# Patient Record
Sex: FEMALE | Race: BLACK OR AFRICAN AMERICAN | NOT HISPANIC OR LATINO | URBAN - METROPOLITAN AREA
[De-identification: names, ages, dates, MRNs, and addresses within clinical notes are randomized per-mention and may not be internally consistent; named-entity substitution may affect disease eponyms.]

---

## 2020-07-25 ENCOUNTER — TELEPHONE ENCOUNTER (OUTPATIENT)
Dept: URBAN - METROPOLITAN AREA CLINIC 13 | Facility: CLINIC | Age: 65
End: 2020-07-25

## 2020-07-25 RX ORDER — SUCRALFATE 1 G/10ML
TAKE 1 TEASPOONFUL 4 TIMES DAILY BEFORE MEALS AND AT BEDTIME SUSPENSION ORAL
Qty: 1 | Refills: 3 | OUTPATIENT
Start: 2014-08-25 | End: 2014-09-26

## 2020-07-25 RX ORDER — PANTOPRAZOLE SODIUM 40 MG/1
TAKE 1 TABLET DAILY TABLET, DELAYED RELEASE ORAL
Qty: 30 | Refills: 8 | OUTPATIENT
Start: 2014-02-13 | End: 2014-04-14

## 2020-07-25 RX ORDER — OMEPRAZOLE 40 MG/1
TAKE 1 CAPSULE DAILY CAPSULE, DELAYED RELEASE ORAL
Qty: 30 | Refills: 5 | OUTPATIENT
Start: 2014-09-26 | End: 2015-04-03

## 2020-07-25 RX ORDER — LISINOPRIL AND HYDROCHLOROTHIAZIDE TABLETS 20; 12.5 MG/1; MG/1
TAKE 1 TABLET DAILY TABLET ORAL
Refills: 0 | OUTPATIENT
End: 2014-06-04

## 2020-07-25 RX ORDER — POLYETHYLENE GLYCOL 3350, SODIUM SULFATE, SODIUM CHLORIDE, POTASSIUM CHLORIDE, ASCORBIC ACID, SODIUM ASCORBATE 7.5-2.691G
USE AS DIRECTED KIT ORAL
Qty: 1 | Refills: 0 | OUTPATIENT
Start: 2014-02-13 | End: 2014-04-14

## 2020-07-25 RX ORDER — OMEPRAZOLE 20 MG/1
TAKE 1 CAPSULE DAILY CAPSULE, DELAYED RELEASE ORAL
Refills: 0 | OUTPATIENT
End: 2015-04-03

## 2020-07-25 RX ORDER — OMEPRAZOLE 20 MG/1
TAKE 1 TABLET DAILY TABLET, DELAYED RELEASE ORAL
Refills: 0 | OUTPATIENT
End: 2014-03-18

## 2020-07-25 RX ORDER — LANSOPRAZOLE 30 MG/1
TAKE 1 CAPSULE DAILY CAPSULE, DELAYED RELEASE ORAL
Qty: 30 | Refills: 5 | OUTPATIENT
Start: 2014-04-21 | End: 2014-06-04

## 2020-07-25 RX ORDER — SUCRALFATE 1 G/1
TAKE 1 TABLET 4 TIMES DAILY CRUSH TABLET IN 2 TBLS OF LIQUID TO MAKE SLURRY AND SWALLOW TABLET ORAL
Qty: 120 | Refills: 3 | OUTPATIENT
Start: 2014-09-04 | End: 2015-04-03

## 2020-07-26 ENCOUNTER — TELEPHONE ENCOUNTER (OUTPATIENT)
Dept: URBAN - METROPOLITAN AREA CLINIC 13 | Facility: CLINIC | Age: 65
End: 2020-07-26

## 2020-07-26 RX ORDER — B1/B2/B3/B5/B6/IRON/METH/CHOLN 2.5-18/15
TAKE 1 TABLET DAILY AS DIRECTED LIQUID (ML) ORAL
Refills: 0 | Status: ACTIVE | COMMUNITY

## 2020-07-26 RX ORDER — AMITRIPTYLINE HYDROCHLORIDE 10 MG/1
TAKE 1 TABLET DAILY AT BEDTIME TABLET, FILM COATED ORAL
Qty: 30 | Refills: 3 | Status: ACTIVE | COMMUNITY
Start: 2015-04-03

## 2020-07-26 RX ORDER — LISINOPRIL AND HYDROCHLOROTHIAZIDE TABLETS 20; 12.5 MG/1; MG/1
TAKE 1 TABLET DAILY TABLET ORAL
Refills: 0 | Status: ACTIVE | COMMUNITY

## 2020-07-26 RX ORDER — ESOMEPRAZOLE MAGNESIUM 40 MG
TAKE 1 CAPSULE DAILY AS NEEDED CAPSULE,DELAYED RELEASE (ENTERIC COATED) ORAL
Refills: 0 | Status: ACTIVE | COMMUNITY

## 2020-07-26 RX ORDER — SITAGLIPTIN AND METFORMIN HYDROCHLORIDE 50; 1000 MG/1; MG/1
TAKE 1 TABLET DAILY AS DIRECTED TABLET, FILM COATED ORAL
Refills: 0 | Status: ACTIVE | COMMUNITY
Start: 2015-03-04

## 2020-07-26 RX ORDER — METFORMIN HYDROCHLORIDE 500 MG/1
TABLET, COATED ORAL
Qty: 60 | Refills: 0 | Status: ACTIVE | COMMUNITY
Start: 2014-11-14

## 2024-01-31 ENCOUNTER — LAB OUTSIDE AN ENCOUNTER (OUTPATIENT)
Dept: URBAN - METROPOLITAN AREA CLINIC 113 | Facility: CLINIC | Age: 69
End: 2024-01-31

## 2024-01-31 ENCOUNTER — OFFICE VISIT (OUTPATIENT)
Dept: URBAN - METROPOLITAN AREA CLINIC 113 | Facility: CLINIC | Age: 69
End: 2024-01-31
Payer: MEDICARE

## 2024-01-31 VITALS
SYSTOLIC BLOOD PRESSURE: 129 MMHG | HEIGHT: 63 IN | BODY MASS INDEX: 36.14 KG/M2 | TEMPERATURE: 97.3 F | HEART RATE: 62 BPM | WEIGHT: 204 LBS | RESPIRATION RATE: 14 BRPM | DIASTOLIC BLOOD PRESSURE: 68 MMHG

## 2024-01-31 DIAGNOSIS — K21.9 GASTROESOPHAGEAL REFLUX DISEASE, UNSPECIFIED WHETHER ESOPHAGITIS PRESENT: ICD-10-CM

## 2024-01-31 DIAGNOSIS — R13.19 ESOPHAGEAL DYSPHAGIA: ICD-10-CM

## 2024-01-31 PROBLEM — 235595009: Status: ACTIVE | Noted: 2024-01-31

## 2024-01-31 PROCEDURE — 99204 OFFICE O/P NEW MOD 45 MIN: CPT | Performed by: STUDENT IN AN ORGANIZED HEALTH CARE EDUCATION/TRAINING PROGRAM

## 2024-01-31 RX ORDER — MELOXICAM 15 MG/1
1 TABLET TABLET ORAL ONCE A DAY
Status: ACTIVE | COMMUNITY

## 2024-01-31 RX ORDER — SITAGLIPTIN AND METFORMIN HYDROCHLORIDE 50; 1000 MG/1; MG/1
TAKE 1 TABLET DAILY AS DIRECTED TABLET, FILM COATED ORAL
Refills: 0 | Status: ON HOLD | COMMUNITY
Start: 2015-03-04

## 2024-01-31 RX ORDER — LISINOPRIL AND HYDROCHLOROTHIAZIDE TABLETS 20; 12.5 MG/1; MG/1
TAKE 1 TABLET DAILY TABLET ORAL
Refills: 0 | Status: ACTIVE | COMMUNITY

## 2024-01-31 RX ORDER — B1/B2/B3/B5/B6/IRON/METH/CHOLN 2.5-18/15
TAKE 1 TABLET DAILY AS DIRECTED LIQUID (ML) ORAL
Refills: 0 | Status: ON HOLD | COMMUNITY

## 2024-01-31 RX ORDER — PANTOPRAZOLE SODIUM 40 MG/1
1 TABLET TABLET, DELAYED RELEASE ORAL ONCE A DAY
Status: ACTIVE | COMMUNITY

## 2024-01-31 RX ORDER — METFORMIN HYDROCHLORIDE 500 MG/1
TABLET, COATED ORAL
Qty: 60 | Refills: 0 | Status: ON HOLD | COMMUNITY
Start: 2014-11-14

## 2024-01-31 RX ORDER — ESOMEPRAZOLE MAGNESIUM 40 MG
TAKE 1 CAPSULE DAILY AS NEEDED CAPSULE,DELAYED RELEASE (ENTERIC COATED) ORAL
Refills: 0 | Status: ON HOLD | COMMUNITY

## 2024-01-31 RX ORDER — METHIMAZOLE 5 MG/1
1 TABLET TABLET ORAL ONCE A DAY
Status: ACTIVE | COMMUNITY

## 2024-01-31 RX ORDER — AMITRIPTYLINE HYDROCHLORIDE 10 MG/1
TAKE 1 TABLET DAILY AT BEDTIME TABLET, FILM COATED ORAL
Qty: 30 | Refills: 3 | Status: ACTIVE | COMMUNITY
Start: 2015-04-03

## 2024-01-31 NOTE — HPI-TODAY'S VISIT:
Initial visit 1/31/2024; PCP Dr. Domenica Wiley Mr. Cervantes is a 68-year-old female with a past medical history significant for diabetes, GERD, HTN, insomnia on amitriptyline. She is referred to the GI clinic for evaluation of her acid reflux.Last EGD 3/18/2014 performed for dysphagia and chest pain: esophagus was normal, medium size hiatal hernia was found, duodenum was normal.Patient describes dysphagia to solid foods, especially if she eats fast. She is on PPI and H2RA BID with no improvement in her symptoms. She also describes occasional substernal chest pain that feels like a muscle cramp. She denies dysphagia. This symptom has been ongoing for years. She is nont losing weight and for the most part is able to eat her meals.

## 2024-02-15 ENCOUNTER — EGD W/ BRV (OUTPATIENT)
Dept: URBAN - METROPOLITAN AREA SURGERY CENTER 25 | Facility: SURGERY CENTER | Age: 69
End: 2024-02-15
Payer: MEDICARE

## 2024-02-15 ENCOUNTER — LAB (OUTPATIENT)
Dept: URBAN - METROPOLITAN AREA CLINIC 4 | Facility: CLINIC | Age: 69
End: 2024-02-15
Payer: MEDICARE

## 2024-02-15 ENCOUNTER — LAB (OUTPATIENT)
Dept: URBAN - METROPOLITAN AREA CLINIC 113 | Facility: CLINIC | Age: 69
End: 2024-02-15

## 2024-02-15 DIAGNOSIS — R12 HEARTBURN: ICD-10-CM

## 2024-02-15 DIAGNOSIS — K29.70 GASTRITIS, UNSPECIFIED, WITHOUT BLEEDING: ICD-10-CM

## 2024-02-15 DIAGNOSIS — R13.19 DYSPHAGIA: ICD-10-CM

## 2024-02-15 DIAGNOSIS — K31.89 REACTIVE GASTROPATHY: ICD-10-CM

## 2024-02-15 PROCEDURE — 43239 EGD BIOPSY SINGLE/MULTIPLE: CPT | Performed by: STUDENT IN AN ORGANIZED HEALTH CARE EDUCATION/TRAINING PROGRAM

## 2024-02-15 PROCEDURE — 88312 SPECIAL STAINS GROUP 1: CPT | Performed by: PATHOLOGY

## 2024-02-15 PROCEDURE — 43450 DILATE ESOPHAGUS 1/MULT PASS: CPT | Performed by: STUDENT IN AN ORGANIZED HEALTH CARE EDUCATION/TRAINING PROGRAM

## 2024-02-15 PROCEDURE — 88305 TISSUE EXAM BY PATHOLOGIST: CPT | Performed by: PATHOLOGY

## 2024-02-15 RX ORDER — SITAGLIPTIN AND METFORMIN HYDROCHLORIDE 50; 1000 MG/1; MG/1
TAKE 1 TABLET DAILY AS DIRECTED TABLET, FILM COATED ORAL
Refills: 0 | Status: ON HOLD | COMMUNITY
Start: 2015-03-04

## 2024-02-15 RX ORDER — PANTOPRAZOLE SODIUM 40 MG/1
1 TABLET TABLET, DELAYED RELEASE ORAL ONCE A DAY
Status: ACTIVE | COMMUNITY

## 2024-02-15 RX ORDER — MELOXICAM 15 MG/1
1 TABLET TABLET ORAL ONCE A DAY
Status: ACTIVE | COMMUNITY

## 2024-02-15 RX ORDER — METHIMAZOLE 5 MG/1
1 TABLET TABLET ORAL ONCE A DAY
Status: ACTIVE | COMMUNITY

## 2024-02-15 RX ORDER — ESOMEPRAZOLE MAGNESIUM 40 MG
TAKE 1 CAPSULE DAILY AS NEEDED CAPSULE,DELAYED RELEASE (ENTERIC COATED) ORAL
Refills: 0 | Status: ON HOLD | COMMUNITY

## 2024-02-15 RX ORDER — AMITRIPTYLINE HYDROCHLORIDE 10 MG/1
TAKE 1 TABLET DAILY AT BEDTIME TABLET, FILM COATED ORAL
Qty: 30 | Refills: 3 | Status: ACTIVE | COMMUNITY
Start: 2015-04-03

## 2024-02-15 RX ORDER — METFORMIN HYDROCHLORIDE 500 MG/1
TABLET, COATED ORAL
Qty: 60 | Refills: 0 | Status: ON HOLD | COMMUNITY
Start: 2014-11-14

## 2024-02-15 RX ORDER — B1/B2/B3/B5/B6/IRON/METH/CHOLN 2.5-18/15
TAKE 1 TABLET DAILY AS DIRECTED LIQUID (ML) ORAL
Refills: 0 | Status: ON HOLD | COMMUNITY

## 2024-02-15 RX ORDER — LISINOPRIL AND HYDROCHLOROTHIAZIDE TABLETS 20; 12.5 MG/1; MG/1
TAKE 1 TABLET DAILY TABLET ORAL
Refills: 0 | Status: ACTIVE | COMMUNITY

## 2024-02-19 ENCOUNTER — BRAVO RTN (OUTPATIENT)
Dept: URBAN - METROPOLITAN AREA CLINIC 112 | Facility: CLINIC | Age: 69
End: 2024-02-19

## 2024-03-26 ENCOUNTER — OV EP (OUTPATIENT)
Dept: URBAN - METROPOLITAN AREA CLINIC 113 | Facility: CLINIC | Age: 69
End: 2024-03-26
Payer: MEDICARE

## 2024-03-26 VITALS
DIASTOLIC BLOOD PRESSURE: 69 MMHG | TEMPERATURE: 97.3 F | HEART RATE: 61 BPM | WEIGHT: 195 LBS | RESPIRATION RATE: 16 BRPM | SYSTOLIC BLOOD PRESSURE: 129 MMHG | BODY MASS INDEX: 34.55 KG/M2 | HEIGHT: 63 IN

## 2024-03-26 DIAGNOSIS — R13.19 ESOPHAGEAL DYSPHAGIA: ICD-10-CM

## 2024-03-26 DIAGNOSIS — R10.13 EPIGASTRIC PAIN: ICD-10-CM

## 2024-03-26 PROCEDURE — 99214 OFFICE O/P EST MOD 30 MIN: CPT | Performed by: STUDENT IN AN ORGANIZED HEALTH CARE EDUCATION/TRAINING PROGRAM

## 2024-03-26 RX ORDER — ESOMEPRAZOLE MAGNESIUM 40 MG
TAKE 1 CAPSULE DAILY AS NEEDED CAPSULE,DELAYED RELEASE (ENTERIC COATED) ORAL
Refills: 0 | Status: ON HOLD | COMMUNITY

## 2024-03-26 RX ORDER — METHIMAZOLE 5 MG/1
1 TABLET TABLET ORAL ONCE A DAY
Status: ACTIVE | COMMUNITY

## 2024-03-26 RX ORDER — LISINOPRIL AND HYDROCHLOROTHIAZIDE TABLETS 20; 12.5 MG/1; MG/1
TAKE 1 TABLET DAILY TABLET ORAL
Refills: 0 | Status: ACTIVE | COMMUNITY

## 2024-03-26 RX ORDER — AMITRIPTYLINE HYDROCHLORIDE 10 MG/1
TAKE 1 TABLET DAILY AT BEDTIME TABLET, FILM COATED ORAL
Qty: 30 | Refills: 3 | Status: ACTIVE | COMMUNITY
Start: 2015-04-03

## 2024-03-26 RX ORDER — MELOXICAM 15 MG/1
1 TABLET TABLET ORAL ONCE A DAY
Status: ACTIVE | COMMUNITY

## 2024-03-26 RX ORDER — PANTOPRAZOLE SODIUM 40 MG/1
1 TABLET TABLET, DELAYED RELEASE ORAL ONCE A DAY
Status: ACTIVE | COMMUNITY

## 2024-03-26 RX ORDER — AMITRIPTYLINE HYDROCHLORIDE 25 MG/1
TAKE 1 TABLET DAILY AT BEDTIME TABLET, FILM COATED ORAL ONCE A DAY
Qty: 90 | Refills: 1
Start: 2015-04-03

## 2024-03-26 RX ORDER — SITAGLIPTIN AND METFORMIN HYDROCHLORIDE 50; 1000 MG/1; MG/1
TAKE 1 TABLET DAILY AS DIRECTED TABLET, FILM COATED ORAL
Refills: 0 | Status: ON HOLD | COMMUNITY
Start: 2015-03-04

## 2024-03-26 RX ORDER — METFORMIN HYDROCHLORIDE 500 MG/1
TABLET, COATED ORAL
Qty: 60 | Refills: 0 | Status: ON HOLD | COMMUNITY
Start: 2014-11-14

## 2024-03-26 RX ORDER — B1/B2/B3/B5/B6/IRON/METH/CHOLN 2.5-18/15
TAKE 1 TABLET DAILY AS DIRECTED LIQUID (ML) ORAL
Refills: 0 | Status: ON HOLD | COMMUNITY

## 2024-03-26 NOTE — HPI-TODAY'S VISIT:
Initial visit 1/31/2024; PCP Dr. Domenica Wiley Mr. Cervantes is a 68-year-old female with a past medical history significant for diabetes, GERD, HTN, insomnia on amitriptyline. She is referred to the GI clinic for evaluation of her acid reflux.Last EGD 3/18/2014 performed for dysphagia and chest pain: esophagus was normal, medium size hiatal hernia was found, duodenum was normal.Patient describes dysphagia to solid foods, especially if she eats fast. She is on PPI and H2RA BID with no improvement in her symptoms. She also describes occasional substernal chest pain that feels like a muscle cramp. She denies dysphagia. This symptom has been ongoing for years. She is nont losing weight and for the most part is able to eat her meals. . Follow up visit 3/26/24 EGD 2/15/24 performed for dysphagia and reflux: Esophagus appeared normal, empiric dilation to 60Fr was performed using a Valente dilatoro, Z-line regular jordy 36 cm, a 3 cm hiatal hernia was present, patchy mild erythema was found in the gastric antrum, the duodenum appeared normal. Esophageal biopsies were normal, gastric biopsies reveales reactive gastropathy. She states that her dilation did not change her symptoms, she also stopped PPI without a change in her symptoms. She continues to have intermittent epigastric pain, unrelated to meals that feels like a contraction and improves with drinking water. She continues to have mild dysphagia symptoms to solid food in the upper chest.

## 2024-04-22 ENCOUNTER — LAB (OUTPATIENT)
Dept: URBAN - METROPOLITAN AREA CLINIC 113 | Facility: CLINIC | Age: 69
End: 2024-04-22

## 2024-05-01 ENCOUNTER — TELEPHONE ENCOUNTER (OUTPATIENT)
Dept: URBAN - METROPOLITAN AREA CLINIC 113 | Facility: CLINIC | Age: 69
End: 2024-05-01

## 2024-05-13 ENCOUNTER — TELEPHONE ENCOUNTER (OUTPATIENT)
Dept: URBAN - METROPOLITAN AREA CLINIC 113 | Facility: CLINIC | Age: 69
End: 2024-05-13

## 2024-06-04 ENCOUNTER — OFFICE VISIT (OUTPATIENT)
Dept: URBAN - METROPOLITAN AREA CLINIC 113 | Facility: CLINIC | Age: 69
End: 2024-06-04
Payer: MEDICARE

## 2024-06-04 ENCOUNTER — DASHBOARD ENCOUNTERS (OUTPATIENT)
Age: 69
End: 2024-06-04

## 2024-06-04 VITALS
BODY MASS INDEX: 37.92 KG/M2 | TEMPERATURE: 97.5 F | WEIGHT: 214 LBS | RESPIRATION RATE: 18 BRPM | DIASTOLIC BLOOD PRESSURE: 76 MMHG | HEIGHT: 63 IN | SYSTOLIC BLOOD PRESSURE: 142 MMHG

## 2024-06-04 DIAGNOSIS — K22.4 DIFFUSE ESOPHAGEAL SPASM: ICD-10-CM

## 2024-06-04 PROBLEM — 79962008: Status: ACTIVE | Noted: 2024-06-04

## 2024-06-04 PROCEDURE — 99214 OFFICE O/P EST MOD 30 MIN: CPT | Performed by: STUDENT IN AN ORGANIZED HEALTH CARE EDUCATION/TRAINING PROGRAM

## 2024-06-04 RX ORDER — METHIMAZOLE 5 MG/1
1 TABLET TABLET ORAL ONCE A DAY
Status: ACTIVE | COMMUNITY

## 2024-06-04 RX ORDER — METFORMIN HYDROCHLORIDE 500 MG/1
TABLET, COATED ORAL
Qty: 60 | Refills: 0 | Status: ON HOLD | COMMUNITY
Start: 2014-11-14

## 2024-06-04 RX ORDER — LISINOPRIL AND HYDROCHLOROTHIAZIDE TABLETS 20; 12.5 MG/1; MG/1
TAKE 1 TABLET DAILY TABLET ORAL
Refills: 0 | Status: ACTIVE | COMMUNITY

## 2024-06-04 RX ORDER — ISOSORBIDE DINITRATE 5 MG/1
1 TABLET TABLET ORAL TWICE A DAY
Qty: 60 | OUTPATIENT
Start: 2024-06-04 | End: 2024-07-04

## 2024-06-04 RX ORDER — SITAGLIPTIN AND METFORMIN HYDROCHLORIDE 50; 1000 MG/1; MG/1
TAKE 1 TABLET DAILY AS DIRECTED TABLET, FILM COATED ORAL
Refills: 0 | Status: ON HOLD | COMMUNITY
Start: 2015-03-04

## 2024-06-04 RX ORDER — ESOMEPRAZOLE MAGNESIUM 40 MG
TAKE 1 CAPSULE DAILY AS NEEDED CAPSULE,DELAYED RELEASE (ENTERIC COATED) ORAL
Refills: 0 | Status: ON HOLD | COMMUNITY

## 2024-06-04 RX ORDER — MELOXICAM 15 MG/1
1 TABLET TABLET ORAL ONCE A DAY
Status: ACTIVE | COMMUNITY

## 2024-06-04 RX ORDER — AMITRIPTYLINE HYDROCHLORIDE 25 MG/1
TAKE 1 TABLET DAILY AT BEDTIME TABLET, FILM COATED ORAL ONCE A DAY
Qty: 90 | Refills: 1 | Status: ACTIVE | COMMUNITY
Start: 2015-04-03

## 2024-06-04 RX ORDER — PANTOPRAZOLE SODIUM 40 MG/1
1 TABLET TABLET, DELAYED RELEASE ORAL ONCE A DAY
Status: ACTIVE | COMMUNITY

## 2024-06-04 RX ORDER — B1/B2/B3/B5/B6/IRON/METH/CHOLN 2.5-18/15
TAKE 1 TABLET DAILY AS DIRECTED LIQUID (ML) ORAL
Refills: 0 | Status: ON HOLD | COMMUNITY

## 2024-06-04 NOTE — HPI-TODAY'S VISIT:
Initial visit 1/31/2024; PCP Dr. Domenica Wiley Mr. Cervantes is a 68-year-old female with a past medical history significant for diabetes, GERD, HTN, insomnia on amitriptyline. She is referred to the GI clinic for evaluation of her acid reflux.Last EGD 3/18/2014 performed for dysphagia and chest pain: esophagus was normal, medium size hiatal hernia was found, duodenum was normal.Patient describes dysphagia to solid foods, especially if she eats fast. She is on PPI and H2RA BID with no improvement in her symptoms. She also describes occasional substernal chest pain that feels like a muscle cramp. She denies dysphagia. This symptom has been ongoing for years. She is nont losing weight and for the most part is able to eat her meals. . Follow up visit 3/26/24 EGD 2/15/24 performed for dysphagia and reflux: Esophagus appeared normal, empiric dilation to 60Fr was performed using a Valente dilatoro, Z-line regular jordy 36 cm, a 3 cm hiatal hernia was present, patchy mild erythema was found in the gastric antrum, the duodenum appeared normal. Esophageal biopsies were normal, gastric biopsies reveales reactive gastropathy. She states that her dilation did not change her symptoms, she also stopped PPI without a change in her symptoms. She continues to have intermittent epigastric pain, unrelated to meals that feels like a contraction and improves with drinking water. She continues to have mild dysphagia symptoms to solid food in the upper chest. . Follow-up visit 6/4/2024 Esophageal manometry: Revealed distal esophageal spasm. She was trialed on amitriptyline for possible functional dysphagia prior to her manometry results however this was not helpful for her and she did suffer a side effect of severe drowsiness Abdominal ultrasound 4/26/2024: No acute findings, mildly increased hepatic echogenicity suggestive of hepatic steatosis. She continues to have episodic substernal chest pain described as a contraction.  She stopped amitriptyline secondary to severe drowsiness/fatigue despite taking it at night.  She has tried peppermints as well as hyoscyamine with partial relief of symptoms as needed.  This continues to bother her.

## 2024-07-15 ENCOUNTER — OFFICE VISIT (OUTPATIENT)
Dept: URBAN - METROPOLITAN AREA CLINIC 113 | Facility: CLINIC | Age: 69
End: 2024-07-15
Payer: MEDICARE

## 2024-07-15 VITALS
HEIGHT: 63 IN | HEART RATE: 70 BPM | RESPIRATION RATE: 18 BRPM | BODY MASS INDEX: 36.82 KG/M2 | TEMPERATURE: 97.3 F | WEIGHT: 207.8 LBS | DIASTOLIC BLOOD PRESSURE: 76 MMHG | SYSTOLIC BLOOD PRESSURE: 133 MMHG

## 2024-07-15 DIAGNOSIS — K44.9 HIATAL HERNIA: ICD-10-CM

## 2024-07-15 DIAGNOSIS — K22.4 DIFFUSE ESOPHAGEAL SPASM: ICD-10-CM

## 2024-07-15 PROCEDURE — 99214 OFFICE O/P EST MOD 30 MIN: CPT | Performed by: STUDENT IN AN ORGANIZED HEALTH CARE EDUCATION/TRAINING PROGRAM

## 2024-07-15 RX ORDER — PANTOPRAZOLE SODIUM 40 MG/1
1 TABLET TABLET, DELAYED RELEASE ORAL ONCE A DAY
Status: ACTIVE | COMMUNITY

## 2024-07-15 RX ORDER — SITAGLIPTIN AND METFORMIN HYDROCHLORIDE 50; 1000 MG/1; MG/1
TAKE 1 TABLET DAILY AS DIRECTED TABLET, FILM COATED ORAL
Refills: 0 | Status: ON HOLD | COMMUNITY
Start: 2015-03-04

## 2024-07-15 RX ORDER — LISINOPRIL AND HYDROCHLOROTHIAZIDE TABLETS 20; 12.5 MG/1; MG/1
TAKE 1 TABLET DAILY TABLET ORAL
Refills: 0 | Status: ACTIVE | COMMUNITY

## 2024-07-15 RX ORDER — METFORMIN HYDROCHLORIDE 500 MG/1
TABLET, COATED ORAL
Qty: 60 | Refills: 0 | Status: ON HOLD | COMMUNITY
Start: 2014-11-14

## 2024-07-15 RX ORDER — ESOMEPRAZOLE MAGNESIUM 40 MG
TAKE 1 CAPSULE DAILY AS NEEDED CAPSULE,DELAYED RELEASE (ENTERIC COATED) ORAL
Refills: 0 | Status: ON HOLD | COMMUNITY

## 2024-07-15 RX ORDER — MELOXICAM 15 MG/1
1 TABLET TABLET ORAL ONCE A DAY
Status: ACTIVE | COMMUNITY

## 2024-07-15 RX ORDER — METHIMAZOLE 5 MG/1
1 TABLET TABLET ORAL ONCE A DAY
Status: ACTIVE | COMMUNITY

## 2024-07-15 RX ORDER — AMITRIPTYLINE HYDROCHLORIDE 25 MG/1
TAKE 1 TABLET DAILY AT BEDTIME TABLET, FILM COATED ORAL ONCE A DAY
Qty: 90 | Refills: 1 | Status: ACTIVE | COMMUNITY
Start: 2015-04-03

## 2024-07-15 RX ORDER — B1/B2/B3/B5/B6/IRON/METH/CHOLN 2.5-18/15
TAKE 1 TABLET DAILY AS DIRECTED LIQUID (ML) ORAL
Refills: 0 | Status: ON HOLD | COMMUNITY

## 2024-07-15 RX ORDER — ISOSORBIDE DINITRATE 10 MG/1
1 TABLET TABLET ORAL TWICE A DAY
Qty: 180 TABLET | Refills: 0
Start: 2024-06-04 | End: 2024-10-13

## 2024-07-15 NOTE — HPI-TODAY'S VISIT:
Initial visit 1/31/2024; PCP Dr. Domenica Wiley Mr. Cervantes is a 68-year-old female with a past medical history significant for diabetes, GERD, HTN, insomnia on amitriptyline. She is referred to the GI clinic for evaluation of her acid reflux.Last EGD 3/18/2014 performed for dysphagia and chest pain: esophagus was normal, medium size hiatal hernia was found, duodenum was normal.Patient describes dysphagia to solid foods, especially if she eats fast. She is on PPI and H2RA BID with no improvement in her symptoms. She also describes occasional substernal chest pain that feels like a muscle cramp. She denies dysphagia. This symptom has been ongoing for years. She is nont losing weight and for the most part is able to eat her meals. . Follow up visit 3/26/24 EGD 2/15/24 performed for dysphagia and reflux: Esophagus appeared normal, empiric dilation to 60Fr was performed using a Valente dilatoro, Z-line regular jordy 36 cm, a 3 cm hiatal hernia was present, patchy mild erythema was found in the gastric antrum, the duodenum appeared normal. Esophageal biopsies were normal, gastric biopsies reveales reactive gastropathy. She states that her dilation did not change her symptoms, she also stopped PPI without a change in her symptoms. She continues to have intermittent epigastric pain, unrelated to meals that feels like a contraction and improves with drinking water. She continues to have mild dysphagia symptoms to solid food in the upper chest. . Follow-up visit 6/4/2024 Esophageal manometry: Revealed distal esophageal spasm. She was trialed on amitriptyline for possible functional dysphagia prior to her manometry results however this was not helpful for her and she did suffer a side effect of severe drowsiness Abdominal ultrasound 4/26/2024: No acute findings, mildly increased hepatic echogenicity suggestive of hepatic steatosis. She continues to have episodic substernal chest pain described as a contraction.  She stopped amitriptyline secondary to severe drowsiness/fatigue despite taking it at night.  She has tried peppermints as well as hyoscyamine with partial relief of symptoms as needed.  This continues to bother her. . Follow up visit 7/15/24 She continues to have symptoms of dysphagia and substernal chest pain. She has noticed some relief with isosorbide mononitrate. She has had no improvement with hyoscyamine and amitryptiline. She is interested in discussing hiatal hernia repair.

## 2024-08-20 ENCOUNTER — TELEPHONE ENCOUNTER (OUTPATIENT)
Dept: URBAN - METROPOLITAN AREA CLINIC 13 | Facility: CLINIC | Age: 69
End: 2024-08-20

## 2024-08-20 RX ORDER — VENLAFAXINE HYDROCHLORIDE 37.5 MG/1
1 TABLET WITH FOOD TABLET ORAL ONCE A DAY
Qty: 90 TABLET | Refills: 1 | OUTPATIENT
Start: 2024-08-21

## 2024-08-21 PROBLEM — 300288001: Status: ACTIVE | Noted: 2024-08-21

## 2024-10-01 ENCOUNTER — TELEPHONE ENCOUNTER (OUTPATIENT)
Dept: URBAN - METROPOLITAN AREA CLINIC 113 | Facility: CLINIC | Age: 69
End: 2024-10-01

## 2024-10-01 PROBLEM — 266435005: Status: ACTIVE | Noted: 2024-10-01

## 2024-10-01 RX ORDER — VONOPRAZAN FUMARATE 26.72 MG/1
1 TABLET TABLET ORAL ONCE A DAY
Qty: 90 TABLET | Refills: 1 | OUTPATIENT
Start: 2024-10-01 | End: 2025-03-30

## 2024-10-11 ENCOUNTER — TELEPHONE ENCOUNTER (OUTPATIENT)
Dept: URBAN - METROPOLITAN AREA CLINIC 113 | Facility: CLINIC | Age: 69
End: 2024-10-11

## 2024-10-16 ENCOUNTER — OFFICE VISIT (OUTPATIENT)
Dept: URBAN - METROPOLITAN AREA CLINIC 113 | Facility: CLINIC | Age: 69
End: 2024-10-16
Payer: MEDICARE

## 2024-10-16 ENCOUNTER — TELEPHONE ENCOUNTER (OUTPATIENT)
Dept: URBAN - METROPOLITAN AREA CLINIC 113 | Facility: CLINIC | Age: 69
End: 2024-10-16

## 2024-10-16 VITALS
TEMPERATURE: 97.9 F | DIASTOLIC BLOOD PRESSURE: 65 MMHG | BODY MASS INDEX: 36.29 KG/M2 | HEIGHT: 63 IN | HEART RATE: 74 BPM | RESPIRATION RATE: 18 BRPM | WEIGHT: 204.8 LBS | SYSTOLIC BLOOD PRESSURE: 136 MMHG

## 2024-10-16 DIAGNOSIS — K44.9 HIATAL HERNIA: ICD-10-CM

## 2024-10-16 DIAGNOSIS — K22.4 DIFFUSE ESOPHAGEAL SPASM: ICD-10-CM

## 2024-10-16 PROCEDURE — 99214 OFFICE O/P EST MOD 30 MIN: CPT | Performed by: STUDENT IN AN ORGANIZED HEALTH CARE EDUCATION/TRAINING PROGRAM

## 2024-10-16 RX ORDER — LISINOPRIL AND HYDROCHLOROTHIAZIDE TABLETS 20; 12.5 MG/1; MG/1
TAKE 1 TABLET DAILY TABLET ORAL
Refills: 0 | Status: ACTIVE | COMMUNITY

## 2024-10-16 RX ORDER — METHIMAZOLE 5 MG/1
1 TABLET TABLET ORAL ONCE A DAY
Status: ON HOLD | COMMUNITY

## 2024-10-16 RX ORDER — VONOPRAZAN FUMARATE 26.72 MG/1
1 TABLET TABLET ORAL ONCE A DAY
Qty: 90 TABLET | Refills: 1 | Status: ACTIVE | COMMUNITY
Start: 2024-10-01 | End: 2025-03-30

## 2024-10-16 RX ORDER — METFORMIN HYDROCHLORIDE 500 MG/1
TABLET, COATED ORAL
Qty: 60 | Refills: 0 | Status: ON HOLD | COMMUNITY
Start: 2014-11-14

## 2024-10-16 RX ORDER — VENLAFAXINE HYDROCHLORIDE 37.5 MG/1
1 TABLET WITH FOOD TABLET ORAL ONCE A DAY
Qty: 90 TABLET | Refills: 1 | Status: ACTIVE | COMMUNITY
Start: 2024-08-21

## 2024-10-16 RX ORDER — ESOMEPRAZOLE MAGNESIUM 40 MG
TAKE 1 CAPSULE DAILY AS NEEDED CAPSULE,DELAYED RELEASE (ENTERIC COATED) ORAL
Refills: 0 | Status: ON HOLD | COMMUNITY

## 2024-10-16 RX ORDER — AMITRIPTYLINE HYDROCHLORIDE 25 MG/1
TAKE 1 TABLET DAILY AT BEDTIME TABLET, FILM COATED ORAL ONCE A DAY
Qty: 90 | Refills: 1 | Status: DISCONTINUED | COMMUNITY
Start: 2015-04-03

## 2024-10-16 RX ORDER — B1/B2/B3/B5/B6/IRON/METH/CHOLN 2.5-18/15
TAKE 1 TABLET DAILY AS DIRECTED LIQUID (ML) ORAL
Refills: 0 | Status: ON HOLD | COMMUNITY

## 2024-10-16 RX ORDER — VONOPRAZAN FUMARATE 26.72 MG/1
1 TABLET TABLET ORAL ONCE A DAY
Qty: 90 TABLET | Refills: 1
Start: 2024-10-01 | End: 2025-04-15

## 2024-10-16 RX ORDER — MELOXICAM 15 MG/1
1 TABLET TABLET ORAL ONCE A DAY
Status: ACTIVE | COMMUNITY

## 2024-10-16 RX ORDER — PANTOPRAZOLE SODIUM 40 MG/1
1 TABLET TABLET, DELAYED RELEASE ORAL ONCE A DAY
Status: ACTIVE | COMMUNITY

## 2024-10-16 RX ORDER — SITAGLIPTIN AND METFORMIN HYDROCHLORIDE 50; 1000 MG/1; MG/1
TAKE 1 TABLET DAILY AS DIRECTED TABLET, FILM COATED ORAL
Refills: 0 | Status: ON HOLD | COMMUNITY
Start: 2015-03-04

## 2024-10-16 NOTE — HPI-TODAY'S VISIT:
Initial visit 1/31/2024; PCP Dr. Domenica Wiley Mr. Cervantes is a 68-year-old female with a past medical history significant for diabetes, GERD, HTN, insomnia on amitriptyline. She is referred to the GI clinic for evaluation of her acid reflux.Last EGD 3/18/2014 performed for dysphagia and chest pain: esophagus was normal, medium size hiatal hernia was found, duodenum was normal.Patient describes dysphagia to solid foods, especially if she eats fast. She is on PPI and H2RA BID with no improvement in her symptoms. She also describes occasional substernal chest pain that feels like a muscle cramp. She denies dysphagia. This symptom has been ongoing for years. She is nont losing weight and for the most part is able to eat her meals. . Follow up visit 3/26/24 EGD 2/15/24 performed for dysphagia and reflux: Esophagus appeared normal, empiric dilation to 60Fr was performed using a Valente dilatoro, Z-line regular jordy 36 cm, a 3 cm hiatal hernia was present, patchy mild erythema was found in the gastric antrum, the duodenum appeared normal. Esophageal biopsies were normal, gastric biopsies reveales reactive gastropathy. She states that her dilation did not change her symptoms, she also stopped PPI without a change in her symptoms. She continues to have intermittent epigastric pain, unrelated to meals that feels like a contraction and improves with drinking water. She continues to have mild dysphagia symptoms to solid food in the upper chest. . Follow-up visit 6/4/2024 Esophageal manometry: Revealed distal esophageal spasm. She was trialed on amitriptyline for possible functional dysphagia prior to her manometry results however this was not helpful for her and she did suffer a side effect of severe drowsiness Abdominal ultrasound 4/26/2024: No acute findings, mildly increased hepatic echogenicity suggestive of hepatic steatosis. She continues to have episodic substernal chest pain described as a contraction.  She stopped amitriptyline secondary to severe drowsiness/fatigue despite taking it at night.  She has tried peppermints as well as hyoscyamine with partial relief of symptoms as needed.  This continues to bother her. . Follow up visit 7/15/24 She continues to have symptoms of dysphagia and substernal chest pain. She has noticed some relief with isosorbide mononitrate. She has had no improvement with hyoscyamine and amitryptiline. She is interested in discussing hiatal hernia repair. . Follow up visit 10/16/24 She had another episode of severe chest pain 2 weeks ago. She has been seen by surgery, Dr. Kearney. She has been on venlafaxine without any noticeable improvement in her symptoms. She was unable to get Voquezna as it requires a prior authorization. She is a little hesitant to proceed with a hiatal hernia repair.

## 2024-10-25 ENCOUNTER — TELEPHONE ENCOUNTER (OUTPATIENT)
Dept: URBAN - METROPOLITAN AREA CLINIC 113 | Facility: CLINIC | Age: 69
End: 2024-10-25

## 2024-10-31 ENCOUNTER — TELEPHONE ENCOUNTER (OUTPATIENT)
Dept: URBAN - METROPOLITAN AREA CLINIC 113 | Facility: CLINIC | Age: 69
End: 2024-10-31

## 2024-11-18 ENCOUNTER — TELEPHONE ENCOUNTER (OUTPATIENT)
Dept: URBAN - METROPOLITAN AREA CLINIC 113 | Facility: CLINIC | Age: 69
End: 2024-11-18

## 2024-11-19 ENCOUNTER — LAB OUTSIDE AN ENCOUNTER (OUTPATIENT)
Dept: URBAN - METROPOLITAN AREA CLINIC 113 | Facility: CLINIC | Age: 69
End: 2024-11-19

## 2024-12-09 ENCOUNTER — TELEPHONE ENCOUNTER (OUTPATIENT)
Dept: URBAN - METROPOLITAN AREA CLINIC 113 | Facility: CLINIC | Age: 69
End: 2024-12-09

## 2025-01-03 ENCOUNTER — OFFICE VISIT (OUTPATIENT)
Dept: URBAN - METROPOLITAN AREA MEDICAL CENTER 19 | Facility: MEDICAL CENTER | Age: 70
End: 2025-01-03

## 2025-01-10 ENCOUNTER — OFFICE VISIT (OUTPATIENT)
Dept: URBAN - METROPOLITAN AREA MEDICAL CENTER 19 | Facility: MEDICAL CENTER | Age: 70
End: 2025-01-10
Payer: MEDICARE

## 2025-01-10 DIAGNOSIS — R07.9 ACUTE CHEST PAIN: ICD-10-CM

## 2025-01-10 DIAGNOSIS — R10.13 ABDOMINAL DISCOMFORT, EPIGASTRIC: ICD-10-CM

## 2025-01-10 DIAGNOSIS — K44.9 DIAPHRAGMATIC HERNIA: ICD-10-CM

## 2025-01-10 DIAGNOSIS — K22.4 ESOPHAGEAL SPASM: ICD-10-CM

## 2025-01-10 PROCEDURE — 91040 ESOPH BALLOON DISTENSION TST: CPT | Performed by: STUDENT IN AN ORGANIZED HEALTH CARE EDUCATION/TRAINING PROGRAM

## 2025-01-10 PROCEDURE — 43235 EGD DIAGNOSTIC BRUSH WASH: CPT | Performed by: STUDENT IN AN ORGANIZED HEALTH CARE EDUCATION/TRAINING PROGRAM

## 2025-01-10 RX ORDER — VENLAFAXINE HYDROCHLORIDE 37.5 MG/1
1 TABLET WITH FOOD TABLET ORAL ONCE A DAY
Qty: 90 TABLET | Refills: 1 | Status: ACTIVE | COMMUNITY
Start: 2024-08-21

## 2025-01-10 RX ORDER — MELOXICAM 15 MG/1
1 TABLET TABLET ORAL ONCE A DAY
Status: ACTIVE | COMMUNITY

## 2025-01-10 RX ORDER — LISINOPRIL AND HYDROCHLOROTHIAZIDE TABLETS 20; 12.5 MG/1; MG/1
TAKE 1 TABLET DAILY TABLET ORAL
Refills: 0 | Status: ACTIVE | COMMUNITY

## 2025-01-10 RX ORDER — PANTOPRAZOLE SODIUM 40 MG/1
1 TABLET TABLET, DELAYED RELEASE ORAL ONCE A DAY
Status: ACTIVE | COMMUNITY

## 2025-01-10 RX ORDER — B1/B2/B3/B5/B6/IRON/METH/CHOLN 2.5-18/15
TAKE 1 TABLET DAILY AS DIRECTED LIQUID (ML) ORAL
Refills: 0 | Status: ON HOLD | COMMUNITY

## 2025-01-10 RX ORDER — METFORMIN HYDROCHLORIDE 500 MG/1
TABLET, COATED ORAL
Qty: 60 | Refills: 0 | Status: ON HOLD | COMMUNITY
Start: 2014-11-14

## 2025-01-10 RX ORDER — SITAGLIPTIN AND METFORMIN HYDROCHLORIDE 50; 1000 MG/1; MG/1
TAKE 1 TABLET DAILY AS DIRECTED TABLET, FILM COATED ORAL
Refills: 0 | Status: ON HOLD | COMMUNITY
Start: 2015-03-04

## 2025-01-10 RX ORDER — ESOMEPRAZOLE MAGNESIUM 40 MG
TAKE 1 CAPSULE DAILY AS NEEDED CAPSULE,DELAYED RELEASE (ENTERIC COATED) ORAL
Refills: 0 | Status: ON HOLD | COMMUNITY

## 2025-01-10 RX ORDER — METHIMAZOLE 5 MG/1
1 TABLET TABLET ORAL ONCE A DAY
Status: ON HOLD | COMMUNITY

## 2025-01-10 RX ORDER — VONOPRAZAN FUMARATE 26.72 MG/1
1 TABLET TABLET ORAL ONCE A DAY
Qty: 90 TABLET | Refills: 1 | Status: ACTIVE | COMMUNITY
Start: 2024-10-01 | End: 2025-04-15

## 2025-01-16 ENCOUNTER — TELEPHONE ENCOUNTER (OUTPATIENT)
Dept: URBAN - METROPOLITAN AREA CLINIC 113 | Facility: CLINIC | Age: 70
End: 2025-01-16

## 2025-01-16 RX ORDER — SUCRALFATE 1 G/1
1 TABLET ON AN EMPTY STOMACH TABLET ORAL THREE TIMES A DAY
Qty: 90 TABLET | Refills: 0 | OUTPATIENT
Start: 2025-01-17 | End: 2025-02-16

## 2025-03-17 ENCOUNTER — TELEPHONE ENCOUNTER (OUTPATIENT)
Dept: URBAN - METROPOLITAN AREA CLINIC 113 | Facility: CLINIC | Age: 70
End: 2025-03-17

## 2025-03-27 ENCOUNTER — LAB OUTSIDE AN ENCOUNTER (OUTPATIENT)
Dept: URBAN - METROPOLITAN AREA CLINIC 113 | Facility: CLINIC | Age: 70
End: 2025-03-27

## 2025-03-27 ENCOUNTER — TELEPHONE ENCOUNTER (OUTPATIENT)
Dept: URBAN - METROPOLITAN AREA CLINIC 113 | Facility: CLINIC | Age: 70
End: 2025-03-27

## 2025-04-03 ENCOUNTER — OFFICE VISIT (OUTPATIENT)
Dept: URBAN - METROPOLITAN AREA CLINIC 113 | Facility: CLINIC | Age: 70
End: 2025-04-03
Payer: MEDICARE

## 2025-04-03 DIAGNOSIS — K44.9 HIATAL HERNIA: ICD-10-CM

## 2025-04-03 DIAGNOSIS — K21.9 GASTROESOPHAGEAL REFLUX DISEASE WITHOUT ESOPHAGITIS: ICD-10-CM

## 2025-04-03 PROCEDURE — 99213 OFFICE O/P EST LOW 20 MIN: CPT | Performed by: STUDENT IN AN ORGANIZED HEALTH CARE EDUCATION/TRAINING PROGRAM

## 2025-04-03 RX ORDER — METFORMIN HYDROCHLORIDE 500 MG/1
TABLET, COATED ORAL
Qty: 60 | Refills: 0 | Status: DISCONTINUED | COMMUNITY
Start: 2014-11-14

## 2025-04-03 RX ORDER — MELOXICAM 15 MG/1
1 TABLET TABLET ORAL ONCE A DAY
Status: ACTIVE | COMMUNITY

## 2025-04-03 RX ORDER — METHIMAZOLE 5 MG/1
1 TABLET TABLET ORAL ONCE A DAY
Status: DISCONTINUED | COMMUNITY

## 2025-04-03 RX ORDER — EMPAGLIFLOZIN 25 MG/1
1 TABLET TABLET, FILM COATED ORAL DAILY
Status: ACTIVE | COMMUNITY

## 2025-04-03 RX ORDER — LISINOPRIL AND HYDROCHLOROTHIAZIDE TABLETS 20; 12.5 MG/1; MG/1
TAKE 1 TABLET DAILY TABLET ORAL
Refills: 0 | Status: ACTIVE | COMMUNITY

## 2025-04-03 RX ORDER — SITAGLIPTIN AND METFORMIN HYDROCHLORIDE 50; 1000 MG/1; MG/1
TAKE 1 TABLET DAILY AS DIRECTED TABLET, FILM COATED ORAL
Refills: 0 | Status: DISCONTINUED | COMMUNITY
Start: 2015-03-04

## 2025-04-03 RX ORDER — B1/B2/B3/B5/B6/IRON/METH/CHOLN 2.5-18/15
TAKE 1 TABLET DAILY AS DIRECTED LIQUID (ML) ORAL
Refills: 0 | Status: DISCONTINUED | COMMUNITY

## 2025-04-03 RX ORDER — PANTOPRAZOLE SODIUM 40 MG/1
1 TABLET TABLET, DELAYED RELEASE ORAL ONCE A DAY
Status: ACTIVE | COMMUNITY

## 2025-04-03 RX ORDER — VONOPRAZAN FUMARATE 26.72 MG/1
1 TABLET TABLET ORAL ONCE A DAY
Qty: 90 TABLET | Refills: 1 | Status: DISCONTINUED | COMMUNITY
Start: 2024-10-01 | End: 2025-04-15

## 2025-04-03 RX ORDER — VENLAFAXINE HYDROCHLORIDE 37.5 MG/1
1 TABLET WITH FOOD TABLET ORAL ONCE A DAY
Qty: 90 TABLET | Refills: 1 | Status: DISCONTINUED | COMMUNITY
Start: 2024-08-21

## 2025-04-03 RX ORDER — ESOMEPRAZOLE MAGNESIUM 40 MG
TAKE 1 CAPSULE DAILY AS NEEDED CAPSULE,DELAYED RELEASE (ENTERIC COATED) ORAL
Refills: 0 | Status: DISCONTINUED | COMMUNITY

## 2025-04-03 NOTE — HPI-TODAY'S VISIT:
Initial visit 1/31/2024; PCP Dr. Domenica Wiley Mr. Cervantes is a 68-year-old female with a past medical history significant for diabetes, GERD, HTN, insomnia on amitriptyline. She is referred to the GI clinic for evaluation of her acid reflux.Last EGD 3/18/2014 performed for dysphagia and chest pain: esophagus was normal, medium size hiatal hernia was found, duodenum was normal.Patient describes dysphagia to solid foods, especially if she eats fast. She is on PPI and H2RA BID with no improvement in her symptoms. She also describes occasional substernal chest pain that feels like a muscle cramp. She denies dysphagia. This symptom has been ongoing for years. She is nont losing weight and for the most part is able to eat her meals. . Follow up visit 3/26/24 EGD 2/15/24 performed for dysphagia and reflux: Esophagus appeared normal, empiric dilation to 60Fr was performed using a Valente dilatoro, Z-line regular jordy 36 cm, a 3 cm hiatal hernia was present, patchy mild erythema was found in the gastric antrum, the duodenum appeared normal. Esophageal biopsies were normal, gastric biopsies reveales reactive gastropathy. She states that her dilation did not change her symptoms, she also stopped PPI without a change in her symptoms. She continues to have intermittent epigastric pain, unrelated to meals that feels like a contraction and improves with drinking water. She continues to have mild dysphagia symptoms to solid food in the upper chest. . Follow-up visit 6/4/2024 Esophageal manometry: Revealed distal esophageal spasm. She was trialed on amitriptyline for possible functional dysphagia prior to her manometry results however this was not helpful for her and she did suffer a side effect of severe drowsiness Abdominal ultrasound 4/26/2024: No acute findings, mildly increased hepatic echogenicity suggestive of hepatic steatosis. She continues to have episodic substernal chest pain described as a contraction.  She stopped amitriptyline secondary to severe drowsiness/fatigue despite taking it at night.  She has tried peppermints as well as hyoscyamine with partial relief of symptoms as needed.  This continues to bother her. . Follow up visit 7/15/24 She continues to have symptoms of dysphagia and substernal chest pain. She has noticed some relief with isosorbide mononitrate. She has had no improvement with hyoscyamine and amitryptiline. She is interested in discussing hiatal hernia repair. . Follow up visit 10/16/24 She had another episode of severe chest pain 2 weeks ago. She has been seen by surgery, Dr. Kearney. She has been on venlafaxine without any noticeable improvement in her symptoms. She was unable to get Voquezna as it requires a prior authorization. She is a little hesitant to proceed with a hiatal hernia repair. . Follow-up visit 4/3/25 EGD 1/10/25: Normal esophagus, FLIP revealed appropriate distensibility of the GE junction, absent contractility noted, 2 cm hiatal hernia present, Botox was injected at the GE junction. She continues to have the sensation of chest pain. She is scheduled for cTIF 5/29/25.

## 2025-05-12 ENCOUNTER — TELEPHONE ENCOUNTER (OUTPATIENT)
Dept: URBAN - METROPOLITAN AREA CLINIC 113 | Facility: CLINIC | Age: 70
End: 2025-05-12

## 2025-05-14 ENCOUNTER — TELEPHONE ENCOUNTER (OUTPATIENT)
Dept: URBAN - METROPOLITAN AREA CLINIC 113 | Facility: CLINIC | Age: 70
End: 2025-05-14

## 2025-05-29 ENCOUNTER — OFFICE VISIT (OUTPATIENT)
Dept: URBAN - METROPOLITAN AREA MEDICAL CENTER 19 | Facility: MEDICAL CENTER | Age: 70
End: 2025-05-29

## 2025-07-03 ENCOUNTER — OFFICE VISIT (OUTPATIENT)
Dept: URBAN - METROPOLITAN AREA MEDICAL CENTER 19 | Facility: MEDICAL CENTER | Age: 70
End: 2025-07-03
Payer: MEDICARE

## 2025-07-03 DIAGNOSIS — K21.9 GASTRO-ESOPHAGEAL REFLUX DISEASE WITHOUT ESOPHAGITIS: ICD-10-CM

## 2025-07-03 DIAGNOSIS — K21.9 ACID REFLUX: ICD-10-CM

## 2025-07-03 PROCEDURE — 43282 LAP PARAESOPH HER RPR W/MESH: CPT | Performed by: STUDENT IN AN ORGANIZED HEALTH CARE EDUCATION/TRAINING PROGRAM

## 2025-07-07 ENCOUNTER — TELEPHONE ENCOUNTER (OUTPATIENT)
Dept: URBAN - METROPOLITAN AREA CLINIC 113 | Facility: CLINIC | Age: 70
End: 2025-07-07

## 2025-07-09 ENCOUNTER — TELEPHONE ENCOUNTER (OUTPATIENT)
Dept: URBAN - METROPOLITAN AREA CLINIC 113 | Facility: CLINIC | Age: 70
End: 2025-07-09

## 2025-07-17 ENCOUNTER — TELEPHONE ENCOUNTER (OUTPATIENT)
Dept: URBAN - METROPOLITAN AREA CLINIC 113 | Facility: CLINIC | Age: 70
End: 2025-07-17

## 2025-08-11 ENCOUNTER — OFFICE VISIT (OUTPATIENT)
Dept: URBAN - METROPOLITAN AREA CLINIC 113 | Facility: CLINIC | Age: 70
End: 2025-08-11
Payer: MEDICARE

## 2025-08-11 DIAGNOSIS — K22.4 DIFFUSE ESOPHAGEAL SPASM: ICD-10-CM

## 2025-08-11 DIAGNOSIS — K21.9 GASTROESOPHAGEAL REFLUX DISEASE, UNSPECIFIED WHETHER ESOPHAGITIS PRESENT: ICD-10-CM

## 2025-08-11 PROCEDURE — 99213 OFFICE O/P EST LOW 20 MIN: CPT | Performed by: NURSE PRACTITIONER

## 2025-08-11 RX ORDER — ATORVASTATIN CALCIUM 10 MG/1
1 TABLET TABLET, FILM COATED ORAL DAILY
Status: ACTIVE | COMMUNITY

## 2025-08-11 RX ORDER — LISINOPRIL AND HYDROCHLOROTHIAZIDE TABLETS 20; 12.5 MG/1; MG/1
TAKE 1 TABLET DAILY TABLET ORAL
Refills: 0 | Status: ACTIVE | COMMUNITY

## 2025-08-11 RX ORDER — MELOXICAM 15 MG/1
1 TABLET TABLET ORAL ONCE A DAY
Status: ACTIVE | COMMUNITY

## 2025-08-11 RX ORDER — EMPAGLIFLOZIN 25 MG/1
1 TABLET TABLET, FILM COATED ORAL DAILY
Status: ACTIVE | COMMUNITY

## 2025-08-11 RX ORDER — PANTOPRAZOLE SODIUM 40 MG/1
1 TABLET TABLET, DELAYED RELEASE ORAL ONCE A DAY
Status: ACTIVE | COMMUNITY

## 2025-08-18 ENCOUNTER — TELEPHONE ENCOUNTER (OUTPATIENT)
Dept: URBAN - METROPOLITAN AREA CLINIC 113 | Facility: CLINIC | Age: 70
End: 2025-08-18

## 2025-08-26 ENCOUNTER — TELEPHONE ENCOUNTER (OUTPATIENT)
Dept: URBAN - METROPOLITAN AREA CLINIC 113 | Facility: CLINIC | Age: 70
End: 2025-08-26